# Patient Record
Sex: FEMALE | Race: WHITE | Employment: UNEMPLOYED | URBAN - METROPOLITAN AREA
[De-identification: names, ages, dates, MRNs, and addresses within clinical notes are randomized per-mention and may not be internally consistent; named-entity substitution may affect disease eponyms.]

---

## 2021-03-24 ENCOUNTER — HOSPITAL ENCOUNTER (OUTPATIENT)
Dept: PHYSICAL THERAPY | Age: 31
Discharge: HOME | End: 2021-03-24
Payer: MEDICAID

## 2021-03-24 PROCEDURE — 97535 SELF CARE MNGMENT TRAINING: CPT | Performed by: PHYSICAL MEDICINE & REHABILITATION

## 2021-03-24 PROCEDURE — 97162 PT EVAL MOD COMPLEX 30 MIN: CPT | Performed by: PHYSICAL MEDICINE & REHABILITATION

## 2021-03-24 NOTE — PROGRESS NOTES
Physical Therapy at [facility],   a part of  [facility]  [address] [facility], [address] [place] [address] Drive  Phone: [phone]  Fax: [phone]    Plan of Care/ Statement of Necessity for Physical Therapy Services 2-15    Patient name: Racquel Cohen  : 1990  Provider#: [id number]  Referral source: Topher Schroeder DO      Medical/Treatment Diagnosis: Pelvic floor dysfunction [M62.89]     Prior Hospitalization: see medical history     Comorbidities: 8 weeks post   Prior Level of Function: no urinary incontinence with IADLs, running with no pain  Medications: Verified on Patient Summary List    Start of Care: 3/24/21      Onset Date: 8 weeks      The Plan of Care and following information is based on the information from the initial evaluation. Assessment/ key information:   Patient is a 27year old female  who is , 8 weeks postpartum with complaints of low back pain into the tailbone, urinary incontinence, abdominal weakness. She is nursing full time. Pt presents with pelvic floor weakness, incoordination and decreased endurance. She reports poor bladder habits which may be contributory. Mild abdominal diastasis noted on exam.      Evaluation Complexity History MEDIUM  Complexity : 1-2 comorbidities / personal factors will impact the outcome/ POC ; Examination MEDIUM Complexity : 3 Standardized tests and measures addressing body structure, function, activity limitation and / or participation in recreation  ;Presentation MEDIUM Complexity : Evolving with changing characteristics  ; Clinical Decision Making MEDIUM Complexity : FOTO score of 26-74  Overall Complexity Rating: MEDIUM    Problem List: pain affecting function, decrease strength, decrease ADL/ functional abilitiies, decrease activity tolerance and other involuntary urine loss with IADLs.     Treatment Plan may include any combination of the following: Therapeutic exercise, Therapeutic activities, Neuromuscular re-education, Physical agent/modality, Manual therapy, Patient education, Self Care training and Functional mobility training  Patient / Family readiness to learn indicated by: asking questions  Persons(s) to be included in education: patient (P)  Barriers to Learning/Limitations: None  Patient Goal (s): return to exercise and work with no pain, no urine loss  Patient Self Reported Health Status: good  Rehabilitation Potential: excellent    Short Term Goals: To be accomplished in 6 weeks:  Patient will be independent with a progressive home exercise program    Patient will demonstrate & utilized pelvic floor ms protection techniques   Patient will demonstrate improved PFM strength to 3+/5 bilaterally in order to decrease UI symptoms   Patient will be independent with urge suppression techniques, bladder irritant elmination   Patient will complete a 72 hour bladder diary for analysis  Patient will have no LBP with standing to bathe and change her baby. Long Term Goals: To be accomplished in 12 weeks:  Patient will demonstrate 4/5 PFM strength bilaterally in order to eliminate UI symptoms. Patient will demonstrate 10 second PFM holds at 4/5 in order to elminate UI symptoms. Patient will have no loss of urine with cough/sneeze. Patient will have no loss of urine with urge triggers (key in door, pulling pants down)  Patient will have no EDUARDO with plyometric exercise. Frequency / Duration: Patient to be seen 1 times per week for 6-12 weeks. Patient/ Caregiver education and instruction: self care and activity modification    [x]  Plan of care has been reviewed with AUREA Rao PT, MSPT     3/24/2021     ________________________________________________________________________    I certify that the above Therapy Services are being furnished while the patient is under my care.  I agree with the treatment plan and certify that this therapy is necessary.     [de-identified] Signature:____________________  Date:____________Time: _________      Ira Youngblood DO

## 2021-03-24 NOTE — PROGRESS NOTES
PT INITIAL EVALUATION NOTE 2-15    Patient Name: Jorden Mathews  Date:3/24/2021  : 1990  [x]  Patient  Verified  Payor: /  Blanka Campbellbrisa   In time: 515  Out time: 1115  Total Treatment Time (min): 60  Visit #: 1    Treatment Area: Pelvic floor dysfunction [M62.89]    SUBJECTIVE  Pain Level (0-10 scale): No pain today, up t0 2/10 at worst  Any medication changes, allergies to medications, adverse drug reactions, diagnosis change, or new procedure performed?: [] No    [x] Yes (see summary sheet for update)  Subjective:  Patient is a 27year old female  who is , 8 weeks postpartum with complaints of low back pain into the tailbone, urinary incontinence, abdominal weakness. 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 Low back pain is noticed at night, she has difficulty positioning for sleep, low back ache with lifting and hold babies, prolonged standing. She has occasional R LE radiating ache, she denies paraesthesias. Pain is 2-3/10 at worst, she is painfree for parts of her day. She feels like she needs to pop her low back and hips frequently. She reports stress urinary incontinence episodes every other frequency. She leaks with sneezing, coughing, laughing, running, exercises. She is incontinent of gas, she has a BM every 2 days averaging [place] 1-4. She does strain to pass stool. She has no pain with IC. She estimates she is urinating 5-7 times a day. She has some start and stop, she occasionally will double void.   She will return to the bathroom after urinating to empty again     PMH   10/819 Precipitous  2nd degree tear   21 Precipitous  No tearing     PLOF:   Running for exercise, no LBP with IADLs, no flatus incontinence   Mechanism of Injury: Noted symptoms post 2   Previous Treatment/Compliance: none  PMHx/Surgical Hx: see chart  Work Hx: Teacher, on maternity leave  Living Situation: Spouse, 21 month old,   Pt Goals: return to work and exercise with no incontinence or LBP  Barriers: none  Motivation: good  Substance use: none   Cognition: A & O x 4        OBJECTIVE/EXAMINATION    Posture: mild increased lumbar lordosis     Gait and Functional Mobility:  No compensation   Palpation:  JAYE:    .5 / 1.5 / .5   Joint Mobility:  NT        Lumbar AROM:        R  L  Flexion    Fingertips to mid shin \"stiff low back\"         Extension   Full            Side Bending   Full         Rotation   Full                 30 min Self Care/Home Management:  []  See flow sheet :      Patient instructed in the role of physical therapy in the evaluation and treatment of pelvic floor dysfunction, applicable treatment modalities discussed. Expectations for regular home exercise participation and expected visit frequency in to achieve the patient's goals were discussed. Patient instructed in pelvic floor anatomy and function including levator ani, puborectalis and superficial pelvic  musculature. Patient was provided dietary information to improve stool quality including increasing soluble fiber intake (Bran Buds), probiotics (Activia yogurt) and increased water intake (6-8 glasses a day). Pt instructed in use of [place] stool chart to track progress. Patient provided information on bladder diary completion, will collect intake/measured output data for 72 hours and return for analysis. Patient educated in urinary urge suppression techniques including the KNACK, quick flicks, calmly walking rather than rushing to the toilet, nervous system down training. Patient educated in proper defecation posture and technique including use of Squatty Potty for better puborectalis ms relaxation. Pt to avoid straining to pass stool in order to decrease strain on pelvic floor.          Rationale: increase strength and improve coordination  to improve the patients ability to eliminate incontinence with IADLs            With   [] TE   [] TA   [] Neuro   [] SC   [] other: Patient Education: [x] Review HEP    [] Progressed/Changed HEP based on:   [] positioning   [] body mechanics   [] transfers   [] heat/ice application    [] other:        Other Objective/Functional Measures:                [place] Female Pelvic Floor Inventory  Bladder  Bowel  Prolapse  Sexual     Pain Level (0-10 scale) post treatment:  0       ASSESSMENT:   Pt 8 weeks post precipitous  presents with s/s consistent with pelvic floor dysfunction, stress urinary incontinence, flatus incontinence, poor work related bladder habits, LBP, mild diastasis recti. She will benefit from skilled PT services to address her functional limitations and achieve her goals as stated on the POC.         [x]  See Plan of Stacey Woodward, PT, MSPT   3/24/2021

## 2021-03-29 ENCOUNTER — APPOINTMENT (OUTPATIENT)
Dept: PHYSICAL THERAPY | Age: 31
End: 2021-03-29
Payer: MEDICAID

## 2021-03-31 ENCOUNTER — APPOINTMENT (OUTPATIENT)
Dept: PHYSICAL THERAPY | Age: 31
End: 2021-03-31
Payer: MEDICAID

## 2021-12-01 NOTE — PROGRESS NOTES
Physical Therapy at [facility],   a part of  [facility]  [address] [facility] Dora Victor  Phone: [phone]  Fax: [phone]    Discharge Summary  2-15    Patient name: Jerry Fallon  : 1990  Provider#: [id number]  Referral source: Buck Whitlock DO      Medical/Treatment Diagnosis: Pelvic floor dysfunction [M62.89]     Prior Hospitalization: see medical history     Comorbidities: See Plan of Care  Prior Level of Function:See Plan of Care  Medications: Verified on Patient Summary List    Start of Care: 3/24/21      Onset Date: 6mos+   Visits from Start of Care: 1     Missed Visits: 1  Reporting Period : 3/24/21 - 21      ASSESSMENT/SUMMARY OF CARE:  Pt was referred to pelvic PT for evaluation and treatment of pelvic floor dysfunction, EDUARDO, diastasis recti post partum. Mrs Leonardo Santos was seen for 1 visit, she was provided preliminary education for symptom management. She did not return for subsequent scheduled visits. Current functional status is unknown. No goals met.       RECOMMENDATIONS:  [x]Discontinue therapy: []Patient has reached or is progressing toward set goals      [x]Patient is non-compliant or has abdicated      []Due to lack of appreciable progress towards set goals      []Other    Robert Yuan, PT, MSPT    2021

## 2023-05-10 RX ORDER — NORETHINDRONE ACETATE AND ETHINYL ESTRADIOL 1.5-30(21)
1 KIT ORAL DAILY
COMMUNITY

## 2023-05-10 RX ORDER — PROMETHAZINE HYDROCHLORIDE 25 MG/1
TABLET ORAL EVERY 6 HOURS PRN
COMMUNITY
Start: 2013-12-17